# Patient Record
Sex: MALE | Race: OTHER | Employment: FULL TIME | URBAN - METROPOLITAN AREA
[De-identification: names, ages, dates, MRNs, and addresses within clinical notes are randomized per-mention and may not be internally consistent; named-entity substitution may affect disease eponyms.]

---

## 2020-07-22 ENCOUNTER — HOSPITAL ENCOUNTER (EMERGENCY)
Age: 20
Discharge: HOME | End: 2020-07-23
Attending: EMERGENCY MEDICINE
Payer: SELF-PAY

## 2020-07-22 ENCOUNTER — APPOINTMENT (OUTPATIENT)
Dept: CT IMAGING | Age: 20
End: 2020-07-22
Attending: EMERGENCY MEDICINE
Payer: SELF-PAY

## 2020-07-22 DIAGNOSIS — R10.9 FLANK PAIN: Primary | ICD-10-CM

## 2020-07-22 DIAGNOSIS — R10.9 RIGHT SIDED ABDOMINAL PAIN: ICD-10-CM

## 2020-07-22 LAB
ALBUMIN SERPL-MCNC: 3.9 G/DL (ref 3.4–5)
ALBUMIN/GLOB SERPL: 1.1 {RATIO} (ref 0.8–1.7)
ALP SERPL-CCNC: 105 U/L (ref 45–117)
ALT SERPL-CCNC: 33 U/L (ref 16–61)
ANION GAP SERPL CALC-SCNC: 4 MMOL/L (ref 3–18)
AST SERPL-CCNC: 33 U/L (ref 10–38)
BASOPHILS # BLD: 0 K/UL (ref 0–0.1)
BASOPHILS NFR BLD: 1 % (ref 0–2)
BILIRUB SERPL-MCNC: 0.4 MG/DL (ref 0.2–1)
BUN SERPL-MCNC: 17 MG/DL (ref 7–18)
BUN/CREAT SERPL: 15 (ref 12–20)
CALCIUM SERPL-MCNC: 8.8 MG/DL (ref 8.5–10.1)
CHLORIDE SERPL-SCNC: 104 MMOL/L (ref 100–111)
CO2 SERPL-SCNC: 32 MMOL/L (ref 21–32)
CREAT SERPL-MCNC: 1.13 MG/DL (ref 0.6–1.3)
DIFFERENTIAL METHOD BLD: ABNORMAL
EOSINOPHIL # BLD: 0.4 K/UL (ref 0–0.4)
EOSINOPHIL NFR BLD: 6 % (ref 0–5)
ERYTHROCYTE [DISTWIDTH] IN BLOOD BY AUTOMATED COUNT: 12.7 % (ref 11.6–14.5)
GLOBULIN SER CALC-MCNC: 3.5 G/DL (ref 2–4)
GLUCOSE SERPL-MCNC: 94 MG/DL (ref 74–99)
HCT VFR BLD AUTO: 42.6 % (ref 36–48)
HGB BLD-MCNC: 14.3 G/DL (ref 13–16)
LYMPHOCYTES # BLD: 2.3 K/UL (ref 0.9–3.6)
LYMPHOCYTES NFR BLD: 34 % (ref 21–52)
MAGNESIUM SERPL-MCNC: 2.1 MG/DL (ref 1.6–2.6)
MCH RBC QN AUTO: 29.4 PG (ref 24–34)
MCHC RBC AUTO-ENTMCNC: 33.6 G/DL (ref 31–37)
MCV RBC AUTO: 87.5 FL (ref 74–97)
MONOCYTES # BLD: 0.7 K/UL (ref 0.05–1.2)
MONOCYTES NFR BLD: 10 % (ref 3–10)
NEUTS SEG # BLD: 3.5 K/UL (ref 1.8–8)
NEUTS SEG NFR BLD: 49 % (ref 40–73)
PLATELET # BLD AUTO: 224 K/UL (ref 135–420)
PMV BLD AUTO: 10.3 FL (ref 9.2–11.8)
POTASSIUM SERPL-SCNC: 4.5 MMOL/L (ref 3.5–5.5)
PROT SERPL-MCNC: 7.4 G/DL (ref 6.4–8.2)
RBC # BLD AUTO: 4.87 M/UL (ref 4.7–5.5)
SODIUM SERPL-SCNC: 140 MMOL/L (ref 136–145)
WBC # BLD AUTO: 6.9 K/UL (ref 4.6–13.2)

## 2020-07-22 PROCEDURE — 96374 THER/PROPH/DIAG INJ IV PUSH: CPT

## 2020-07-22 PROCEDURE — 74177 CT ABD & PELVIS W/CONTRAST: CPT

## 2020-07-22 PROCEDURE — 96375 TX/PRO/DX INJ NEW DRUG ADDON: CPT

## 2020-07-22 PROCEDURE — 85025 COMPLETE CBC W/AUTO DIFF WBC: CPT

## 2020-07-22 PROCEDURE — 99284 EMERGENCY DEPT VISIT MOD MDM: CPT

## 2020-07-22 PROCEDURE — 74011636320 HC RX REV CODE- 636/320: Performed by: EMERGENCY MEDICINE

## 2020-07-22 PROCEDURE — 83735 ASSAY OF MAGNESIUM: CPT

## 2020-07-22 PROCEDURE — 80053 COMPREHEN METABOLIC PANEL: CPT

## 2020-07-22 PROCEDURE — 74011250636 HC RX REV CODE- 250/636: Performed by: EMERGENCY MEDICINE

## 2020-07-22 RX ORDER — ONDANSETRON 2 MG/ML
4 INJECTION INTRAMUSCULAR; INTRAVENOUS
Status: COMPLETED | OUTPATIENT
Start: 2020-07-22 | End: 2020-07-22

## 2020-07-22 RX ORDER — KETOROLAC TROMETHAMINE 30 MG/ML
30 INJECTION, SOLUTION INTRAMUSCULAR; INTRAVENOUS
Status: COMPLETED | OUTPATIENT
Start: 2020-07-22 | End: 2020-07-22

## 2020-07-22 RX ADMIN — ONDANSETRON 4 MG: 2 INJECTION INTRAMUSCULAR; INTRAVENOUS at 23:00

## 2020-07-22 RX ADMIN — SODIUM CHLORIDE 1000 ML: 900 INJECTION, SOLUTION INTRAVENOUS at 23:00

## 2020-07-22 RX ADMIN — IOPAMIDOL 100 ML: 612 INJECTION, SOLUTION INTRAVENOUS at 23:48

## 2020-07-22 RX ADMIN — KETOROLAC TROMETHAMINE 30 MG: 30 INJECTION, SOLUTION INTRAMUSCULAR at 23:00

## 2020-07-22 NOTE — LETTER
[facility] [place] 
[facility] 
[address] [address] [phone] Work/School Note Date: 7/22/2020 To Whom It May concern: 
 
Manual Letters was seen and treated today in the emergency room by the following provider(s): 
Attending Provider: Argenis Last MD. Manual Letters may return to work on 07/26/2020. Sincerely, 
 
 
 
 
 
 
Neeta Santiago.  Izaiah Mendoza MD

## 2020-07-23 ENCOUNTER — PATIENT OUTREACH (OUTPATIENT)
Dept: CASE MANAGEMENT | Age: 20
End: 2020-07-23

## 2020-07-23 VITALS
DIASTOLIC BLOOD PRESSURE: 46 MMHG | WEIGHT: 146 LBS | BODY MASS INDEX: 23.46 KG/M2 | HEIGHT: 66 IN | RESPIRATION RATE: 18 BRPM | SYSTOLIC BLOOD PRESSURE: 108 MMHG | TEMPERATURE: 98.6 F | OXYGEN SATURATION: 100 % | HEART RATE: 81 BPM

## 2020-07-23 RX ORDER — METOCLOPRAMIDE 10 MG/1
10 TABLET ORAL
Qty: 12 TAB | Refills: 0 | Status: SHIPPED | OUTPATIENT
Start: 2020-07-23 | End: 2020-08-02

## 2020-07-23 RX ORDER — IBUPROFEN 600 MG/1
600 TABLET ORAL
Qty: 20 TAB | Refills: 0 | Status: SHIPPED | OUTPATIENT
Start: 2020-07-23

## 2020-07-23 NOTE — ED TRIAGE NOTES
Pt co RLQ pain that began 3.5 hrs ago, describes as intermittent sharp stabbing pain. Rates 8/10 at it's worst, worsened with deep breathing, does not radiate. Pt denies fever, N/V/D, urinary symptoms. Pt reports he had this pain 6 months ago, was never seen for it, as pain resolved on it's own.  Pt denies any medical hx

## 2020-07-23 NOTE — PROGRESS NOTES
Patient contacted regarding recent discharge and COVID-19 risk. Care Transition Nurse/ Ambulatory Care Manager contacted the patient by telephone to perform post discharge assessment. Verified name and  with patient as identifiers. Patient reported that he is doing well and he will  his medication from his pharmacy soon. Pt. States that he is aware that he needs to follow up and he knows that phone numbers are listed on his discharge paperworks. CDC guidelines,  and Red flags on when to go back to ED (Chest pain, difficulty breathing, shortness of breath, high fevers and/or worsening of symptoms) were reviewed and discuss with Pt. Pt. Verbalized and repeated back understanding. CTN attempted to offer get well loop vladislav to Pt., Phone call ended. CTN attempted to call Pt. Back unable to reach. Left a voice message with office contact information. No Pt. Medical information left on vm. Patient has following risk factors of: no known risk factors. CTN/ACM reviewed discharge instructions, medical action plan and red flags related to discharge diagnosis. Reviewed and educated them on any new and changed medications related to discharge diagnosis. Advised obtaining a 90-day supply of all daily and as-needed medications. Advance Care Planning:   Does patient have an Advance Directive: not on file     Education provided regarding infection prevention, and signs and symptoms of COVID-19 and when to seek medical attention with patient who verbalized understanding. Discussed exposure protocols and quarantine from [address] you at higher risk for severe illness  and given an opportunity for questions and concerns. The patient agrees to contact the COVID-19 hotline [phone] or PCP office for questions related to their healthcare. CTN/ACM provided contact information for future reference. From CDC: Are you at higher risk for severe illness?      Wash your hands often.   Avoid close contact (6 feet, which is about two arm lengths) with people who are sick.  Put distance between yourself and other people if COVID-19 is spreading in your community.  Clean and disinfect frequently touched surfaces.  Avoid all cruise travel and non-essential air travel.  Call your healthcare professional if you have concerns about COVID-19 and your underlying condition or if you are sick. For more information on steps you can take to protect yourself, see CDC's How to [address] [place] [address] for follow-up call in 7-14 days based on severity of symptoms and risk factors.

## 2020-07-23 NOTE — DISCHARGE INSTRUCTIONS
Dolor abdominal: Instrucciones de cuidado  Abdominal Pain: Care Instructions  Instrucciones de cuidado    El dolor abdominal tiene muchas causas posibles. Algunas de ellas no son graves y mejoran por sí solas en unos días. Otras requieren Demetria [place] y [place]. Si kent dolor continúa o [place], necesitará jose nueva revisión y [place] pruebas para determinar qué pasa. Es posible que necesite cirugía para corregir el problema. No ignore nuevos síntomas, clarence fiebre, náuseas y [place], [facility], dolor que [place] o [place]. Podrían ser señales de un problema más grave. Kent médico puede haberle recomendado jose consulta de Julio & Víctor las 8 o 12 horas siguientes. Si no se siente mejor, es posible que requiera Demetria [place] o [place]. El médico lo saini revisado minuciosamente, cayden puede dalia problemas más tarde. Si nota algún problema o síntomas nuevos, busque tratamiento médico inmediatamente. La atención de seguimiento es jose parte clave de kent tratamiento y seguridad. Asegúrese de hacer y acudir a todas las citas, y llame a kent médico si está teniendo problemas. También es jose buena idea saber los resultados de vidya exámenes y mantener jose lista de los medicamentos que lulú. ¿Cómo puede cuidarse en el hogar? · Descanse hasta que se sienta mejor. · Para prevenir la deshidratación, rosalinda abundantes líquidos, suficientes para que kent orina sea de color amarillo marlys o transparente clarence el agua. Elija beber agua y otros líquidos carolynn sin cafeína hasta que se sienta mejor. Si tiene [place] [facility] [place], del corazón o del hígado y tiene que [place]'s líquidos, hable con kent médico antes de aumentar kent consumo. · Si tiene [employer], coma alimentos suaves, clarence arroz, pan raymond seco o galletas saladas, bananas (plátanos) y puré de Synchari. Trate de comer varias comidas pequeñas al día en lugar de dos o kevin grandes.   · Espere hasta 50 horas después de que NERI Blank hayan desaparecido antes de comer alimentos condimentados, alcohol y bebidas que contengan cafeína. · No consuma alimentos ricos en grasa. · Evite medicamentos antiinflamatorios clarence aspirina, ibuprofeno (Advil, Motrin) y naproxeno (Aleve). Pueden causar [employer]. Dígale a kent médico si está tomando aspirina diariamente debido a otro problema de nataliia. ¿Cuándo debe pedir ayuda? Llame [id number] en cualquier momento que considere que necesita atención de emergencia. Por ejemplo, llame si:  · Se desmayó (perdió el conocimiento). · Las heces son de color rojizo o muy sanguinolentas (con jaycob). · Vomita jaycob o algo parecido a granos de café molido. · Tiene dolor abdominal nuevo e intenso. Llame a kent médico ahora mismo o busque atención médica inmediata si:  · Kent dolor empeora, sobre todo si se concentra en jose liss parte del vientre. · Vuelve a tener fiebre o tiene fiebre más zay. · Bhavana heces son negruzcas y parecidas al alquitrán o tienen rastros de Radha. · Tiene sangrado vaginal inesperado. · Tiene síntomas de jose infección del tracto urinario. Estos podrían incluir:  ? Dolor al [place] . ? Orinar con más frecuencia que lo habitual.  ? [employer]. · Siente mareos o aturdimiento, o que está a punto de [place]. Preste especial atención a los cambios en kent nataliia y asegúrese de comunicarse con kent médico si:  · No está mejorando después de 1 día (24 horas). ¿Dónde puede encontrar más información en inglés? Lainey Ask a http://lizbeth-lucinda.info/  Ganesh D165 en la búsqueda para aprender más acerca de \"Dolor abdominal: Instrucciones de cuidado. \"  Revisado: 26 junio, 9397               GREGORIA del contenido: 12.5  © 6730-4078 Healthwise, [employer]. Las instrucciones de cuidado fueron adaptadas bajo licencia por Good Help Connections (which disclaims liability or warranty for this information).  Si usted tiene [place] afección médica o sobre estas instrucciones, siempre pregunte a kent profesional de nataliia. [facility] niega toda garantía o responsabilidad por kent uso de esta información. Patient Education        Dolor en el flanco: Instrucciones de cuidado  Flank Pain: Care Instructions  Instrucciones de cuidado  El dolor en el flanco (lumbar) es un dolor en el lado de la espalda finn debajo de la caja torácica y por encima de la cintura. Puede ser en melanie o ambos lados. El dolor lumbar tiene muchas causas posibles, clarence un cálculo renal, jose infección de las vías urinarias o tensión en la espalda. El dolor lumbar puede mejorar por sí solo. Miley no ignore los nuevos síntomas, clarence [employer], náuseas y vómito, problemas urinarios, dolor que [place] y [place]. Pueden ser señales de un problema más grave. Puede que deba hacerse pruebas u otro tratamiento. La atención de seguimiento es jose parte clave de kent tratamiento y seguridad. Asegúrese de hacer y acudir a todas las citas, y llame a kent médico si está teniendo problemas. También es jose buena idea saber los resultados de vidya exámenes y mantener jose lista de los medicamentos que lulú. ¿Cómo puede cuidarse en el hogar? · Descanse hasta que se sienta mejor. · [employer] analgésicos exactamente clarence le fueron indicados. ? Si el [employer] ken un analgésico recetado, tómelo de la forma prescrita. ? Si no está tomando un analgésico recetado, pregúntele a kent médico si puede comfort melanie de venta zheng, clarence acetaminofén (Tylenol), ibuprofeno (Advil, Motrin) o naproxeno (Aleve). Cynthia y siga todas las instrucciones de la [place]. · Si kent médico le recetó antibióticos, tómelos según las indicaciones. No deje de tomarlos sólo porque se sienta mejor. Debe comfort todos los antibióticos hasta terminarlos. · Para aplicar calor, coloque jose bolsa de agua tibia, jose almohadilla térmica a baja temperatura o un paño tibio sobre la radha adolorida.  No se vaya a dormir con jose almohadilla térmica sobre la piel.  · Para prevenir la deshidratación, rosalinda abundantes líquidos, lo suficiente para que kent orina sea de color amarillo marlys o transparente clarence el agua. Elija agua y otros líquidos carolynn sin cafeína hasta que se sienta mejor. Si tiene enfermedad de los riñones, el corazón o el hígado y tiene que [place]'s líquidos, hable con kent médico antes de aumentar la cantidad de líquidos que sumanth. ¿Cuándo debe pedir ayuda? Llame a kent médico ahora mismo o busque atención médica inmediata si:  · Kent dolor lumbar empeora. · Tiene síntomas nuevos clarence fiebre, náuseas o vómito. · Tiene síntomas de un problema urinario. Por ejemplo:  ? Tiene jyacob o pus en la orina. ? Tiene escalofríos o le duele el cuerpo. ? Siente dolor al [place]. ? Tiene dolor en la rickie o el abdomen. Vigile muy de cerca los cambios en kent nataliia, y asegúrese de comunicarse con kent médico si no mejora clarence se esperaba. ¿Dónde puede encontrar más información en inglés? Vaya a http://lizbeth-lucinda.info/  Vance Debary S191 en la búsqueda para aprender más acerca de \"Dolor en el flanco: Instrucciones de cuidado. \"  Revisado: 26 junio, 4278               DYCDGDC del contenido: 12.5  © 2017-2604 Healthwise, Incorporated. Las instrucciones de cuidado fueron adaptadas bajo licencia por Good Help Connections (which disclaims liability or warranty for this information). Si usted tiene [place] afección médica o sobre estas instrucciones, siempre pregunte a kent profesional de nataliia. Healthwise, Incorporated niega toda garantía o responsabilidad por kent uso de esta información.

## 2020-07-23 NOTE — ED PROVIDER NOTES
EMERGENCY DEPARTMENT HISTORY AND PHYSICAL EXAM    Date: 7/22/2020  Patient Name: Michael Bravo    History of Presenting Illness     Chief Complaint   Patient presents with    Abdominal Pain         History Provided By: Patient    Additional History (Context):     10:14 PM    Michael Bravo is a 23 y.o. malepresenting ambulatory to the ED c/o intermittent episodes of sharp/stabbing RLQ abdominal pain, rated as 8/10 pain, starting ~3 hours ago. Pt does sometimes radiate to his lower back. Pain is worsened with deep breaths. He denies any fever/chills, cough, diarrhea, penile pain, hematuria, dysuria, blood in his stool, urine color change, or nausea/vomiting. Pt denies any personal history of FHx of HTN, pulmonary disease, cardiac disease, or DM. Pt has no allergies to medications. Pt works construction. PCP: None  Pt does not drink EtOH excessively or do illicit drugs. Pt denies tobacco smoking. There are no other complaints, changes, or physical findings at this time. Past History     Past Medical History:  History reviewed. No pertinent past medical history. Past Surgical History:  History reviewed. No pertinent surgical history. Family History:  Family History   Problem Relation Age of Onset    No Known Problems Mother     No Known Problems Father        Social History:  Social History     Tobacco Use    Smoking status: Current Some Day Smoker     Packs/day: 0.10    Smokeless tobacco: Never Used   Substance Use Topics    Alcohol use: Yes     Alcohol/week: 1.0 standard drinks     Types: 1 Cans of beer per week    Drug use: Never       Allergies:  No Known Allergies      Review of Systems   Review of Systems   Constitutional: Negative for chills and fever. Respiratory: Negative for cough. Gastrointestinal: Positive for abdominal pain (RLQ). Negative for blood in stool, diarrhea, nausea and vomiting.    Genitourinary: Negative for dysuria, hematuria and penile pain.        Negative for urine color change   Musculoskeletal: Positive for back pain. Negative for myalgias. All other systems reviewed and are negative. Physical Exam     Vitals:    07/22/20 2300 07/22/20 2345 07/23/20 0056 07/23/20 0100   BP: 119/79 122/72 110/58 108/46   Pulse:   81    Resp:   18    Temp:       SpO2: 100% 100% 100% 100%   Weight:       Height:         Physical Exam  Vitals signs and nursing note reviewed. Constitutional:       General: He is not in acute distress. Appearance: He is well-developed. He is not diaphoretic. Comments: Uncomfortable appearing, nontoxic   HENT:      Head: Normocephalic and atraumatic. Right Ear: External ear normal.      Left Ear: External ear normal.      Mouth/Throat:      Pharynx: No oropharyngeal exudate. Eyes:      General: No scleral icterus. Conjunctiva/sclera: Conjunctivae normal.      Pupils: Pupils are equal, round, and reactive to light. Comments: No pallor   Neck:      Musculoskeletal: Normal range of motion and neck supple. Thyroid: No thyromegaly. Vascular: No JVD. Trachea: No tracheal deviation. Cardiovascular:      Rate and Rhythm: Normal rate and regular rhythm. Heart sounds: Normal heart sounds. Pulmonary:      Effort: Pulmonary effort is normal. No respiratory distress. Breath sounds: Normal breath sounds. No stridor. Abdominal:      General: Bowel sounds are normal. There is no distension. Palpations: Abdomen is soft. Tenderness: There is abdominal tenderness in the right lower quadrant. There is guarding (RLQ). There is no rebound. Musculoskeletal: Normal range of motion. General: No tenderness. Comments: No soft tissue injuries   Lymphadenopathy:      Cervical: No cervical adenopathy. Skin:     General: Skin is warm and dry. Findings: No erythema or rash. Neurological:      Mental Status: He is alert and oriented to person, place, and time. Cranial Nerves: No cranial nerve deficit. Coordination: Coordination normal.      Deep Tendon Reflexes: Reflexes are normal and symmetric. Reflexes normal.   Psychiatric:         Behavior: Behavior normal.         Thought Content: Thought content normal.         Judgment: Judgment normal.         Diagnostic Study Results     Labs -     Recent Results (from the past 12 hour(s))   CBC WITH AUTOMATED DIFF    Collection Time: 07/22/20  8:52 PM   Result Value Ref Range    WBC 6.9 4.6 - 13.2 K/uL    RBC 4.87 4.70 - 5.50 M/uL    HGB 14.3 13.0 - 16.0 g/dL    HCT 42.6 36.0 - 48.0 %    MCV 87.5 74.0 - 97.0 FL    MCH 29.4 24.0 - 34.0 PG    MCHC 33.6 31.0 - 37.0 g/dL    RDW 12.7 11.6 - 14.5 %    PLATELET 693 814 - 190 K/uL    MPV 10.3 9.2 - 11.8 FL    NEUTROPHILS 49 40 - 73 %    LYMPHOCYTES 34 21 - 52 %    MONOCYTES 10 3 - 10 %    EOSINOPHILS 6 (H) 0 - 5 %    BASOPHILS 1 0 - 2 %    ABS. NEUTROPHILS 3.5 1.8 - 8.0 K/UL    ABS. LYMPHOCYTES 2.3 0.9 - 3.6 K/UL    ABS. MONOCYTES 0.7 0.05 - 1.2 K/UL    ABS. EOSINOPHILS 0.4 0.0 - 0.4 K/UL    ABS. BASOPHILS 0.0 0.0 - 0.1 K/UL    DF AUTOMATED     METABOLIC PANEL, COMPREHENSIVE    Collection Time: 07/22/20  8:52 PM   Result Value Ref Range    Sodium 140 136 - 145 mmol/L    Potassium 4.5 3.5 - 5.5 mmol/L    Chloride 104 100 - 111 mmol/L    CO2 32 21 - 32 mmol/L    Anion gap 4 3.0 - 18 mmol/L    Glucose 94 74 - 99 mg/dL    BUN 17 7.0 - 18 MG/DL    Creatinine 1.13 0.6 - 1.3 MG/DL    BUN/Creatinine ratio 15 12 - 20      GFR est AA >60 >60 ml/min/1.73m2    GFR est non-AA >60 >60 ml/min/1.73m2    Calcium 8.8 8.5 - 10.1 MG/DL    Bilirubin, total 0.4 0.2 - 1.0 MG/DL    ALT (SGPT) 33 16 - 61 U/L    AST (SGOT) 33 10 - 38 U/L    Alk.  phosphatase 105 45 - 117 U/L    Protein, total 7.4 6.4 - 8.2 g/dL    Albumin 3.9 3.4 - 5.0 g/dL    Globulin 3.5 2.0 - 4.0 g/dL    A-G Ratio 1.1 0.8 - 1.7     MAGNESIUM    Collection Time: 07/22/20  8:52 PM   Result Value Ref Range    Magnesium 2.1 1.6 - 2.6 mg/dL Radiologic Studies -   CT ABD PELV W CONT   Final Result   IMPRESSION:      No acute intra-abdominal process. CT Results  (Last 48 hours)               07/23/20 0031  CT ABD PELV W CONT Final result    Impression:  IMPRESSION:       No acute intra-abdominal process. Narrative:  EXAM: CT of the abdomen and pelvis       INDICATION: Pain. COMPARISON: None. TECHNIQUE: Axial CT imaging of the abdomen and pelvis was performed with   intravenous contrast. Multiplanar reformats were generated. Dose reduction   techniques used: automated exposure control, adjustment of the mAs and/or kVp   according to patient size, and iterative reconstruction techniques. Digital   imaging and communications in Medicine (DICOM) format image data are available   to nonaffiliated external healthcare facilities or entities on a secure, media   free, reciprocally searchable basis with patient authorization for at least 12   months after this study. _______________       FINDINGS:       LOWER CHEST: Unremarkable. LIVER, BILIARY: Liver is normal. No biliary dilation. Gallbladder is   unremarkable. PANCREAS: Normal.       SPLEEN: Normal.       ADRENALS: Normal.       KIDNEYS: Normal.       LYMPH NODES: No enlarged lymph nodes. GASTROINTESTINAL TRACT: No bowel dilation or wall thickening. The appendix is   visualized and is within normal limits       PELVIC ORGANS: Unremarkable. VASCULATURE: Unremarkable. BONES: No acute or aggressive osseous abnormalities identified. OTHER: None.       _______________                   Medical Decision Making   I am the first provider for this patient. I reviewed the vital signs, available nursing notes, past medical history, past surgical history, family history and social history. Vital Signs-Reviewed the patient's vital signs.     Pulse Oximetry Analysis - 100% on RA     Records Reviewed: Nursing Notes    Provider Notes (Medical Decision Making): Primary concern would be gallbladder or appendix disease, though this is very likely viral illness. His exam and history is somewhat equivocal given Maori language. Imaging performed to look for surgical infection or blockage. PROCEDURES:  Procedures    MEDICATIONS GIVEN IN THE ED:  Medications   sodium chloride 0.9 % bolus infusion 1,000 mL (0 mL IntraVENous IV Completed 7/23/20 0000)   ketorolac (TORADOL) injection 30 mg (30 mg IntraVENous Given 7/22/20 2300)   ondansetron (ZOFRAN) injection 4 mg (4 mg IntraVENous Given 7/22/20 2300)   iopamidoL (ISOVUE 300) 61 % contrast injection  mL (100 mL IntraVENous Given 7/22/20 2348)        ED COURSE:   10:14 PM   Initial assessment performed. PROGRESS NOTE:  1:25 AM  Pt and/or family have been updated on their results. Pt and/or pt's family are aware of the plan of care and are in agreement. Written by SUSANNA Thomase, as dictated by Lynn Bernheim Virgle Curio, MD.      Diagnosis and Disposition       DISCHARGE NOTE:  1:26 AM  The patient is ready for discharge. The patient's signs, symptoms, diagnosis, and discharge instructions have been discussed and the patient and/or family has conveyed their understanding. The patient and/or family is to follow up as recommended or return to the ER should their symptoms worsen. Plan has been discussed and the patient and/or family is in agreement. Written by SUSANNA Thomas, as dictated by Lynn Bernheim Virgle Curio, MD.     CLINICAL IMPRESSION:  1. Flank pain    2. Right sided abdominal pain        PLAN:  1. D/C Home  2. Current Discharge Medication List      START taking these medications    Details   ibuprofen (MOTRIN) 600 mg tablet Take 1 Tab by mouth every six (6) hours as needed for Pain. Qty: 20 Tab, Refills: 0      metoclopramide HCl (Reglan) 10 mg tablet Take 1 Tab by mouth every six (6) hours as needed for Nausea for up to 10 days. Qty: 12 Tab, Refills: 0           3.    Follow-up Information     Follow up With Specialties Details Why Contact Info    [address] [place]  Schedule an appointment as soon as possible for a visit for primary care follow up [address], [address]  Schedule an appointment as soon as possible for a visit for primary care follow up [address]    THE [facility] EMERGENCY DEPT Emergency Medicine  As needed, If symptoms worsen [address] [address]  [phone]        _______________________________    Attestations: This note is prepared by Josh Moreno, acting as Scribe for Valerie. Melva Amaral MD.    Valerie. Melva Amaral MD:  The scribe's documentation has been prepared under my direction and personally reviewed by me in its entirety.  I confirm that the note above accurately reflects all work, treatment, procedures, and medical decision making performed by me.  _______________________________